# Patient Record
Sex: MALE | Race: WHITE | Employment: FULL TIME | URBAN - METROPOLITAN AREA
[De-identification: names, ages, dates, MRNs, and addresses within clinical notes are randomized per-mention and may not be internally consistent; named-entity substitution may affect disease eponyms.]

---

## 2021-06-16 ENCOUNTER — OFFICE VISIT (OUTPATIENT)
Dept: FAMILY MEDICINE | Facility: CLINIC | Age: 66
End: 2021-06-16
Payer: COMMERCIAL

## 2021-06-16 VITALS
DIASTOLIC BLOOD PRESSURE: 76 MMHG | TEMPERATURE: 98.1 F | OXYGEN SATURATION: 97 % | HEIGHT: 71 IN | SYSTOLIC BLOOD PRESSURE: 122 MMHG | WEIGHT: 243.5 LBS | HEART RATE: 74 BPM | BODY MASS INDEX: 34.09 KG/M2 | RESPIRATION RATE: 16 BRPM

## 2021-06-16 DIAGNOSIS — E85.4 AMYLOIDOSIS OF SKIN (H): ICD-10-CM

## 2021-06-16 DIAGNOSIS — R22.1 LOCALIZED SWELLING, MASS OR LUMP OF NECK: Primary | ICD-10-CM

## 2021-06-16 DIAGNOSIS — L99 AMYLOIDOSIS OF SKIN (H): ICD-10-CM

## 2021-06-16 DIAGNOSIS — Z13.220 SCREENING FOR HYPERLIPIDEMIA: ICD-10-CM

## 2021-06-16 DIAGNOSIS — Z12.11 SCREEN FOR COLON CANCER: ICD-10-CM

## 2021-06-16 DIAGNOSIS — Z13.1 SCREENING FOR DIABETES MELLITUS: ICD-10-CM

## 2021-06-16 LAB
ERYTHROCYTE [DISTWIDTH] IN BLOOD BY AUTOMATED COUNT: 12.9 % (ref 10–15)
ERYTHROCYTE [SEDIMENTATION RATE] IN BLOOD BY WESTERGREN METHOD: 7 MM/H (ref 0–20)
HCT VFR BLD AUTO: 40.3 % (ref 40–53)
HGB BLD-MCNC: 13.4 G/DL (ref 13.3–17.7)
MCH RBC QN AUTO: 30.5 PG (ref 26.5–33)
MCHC RBC AUTO-ENTMCNC: 33.3 G/DL (ref 31.5–36.5)
MCV RBC AUTO: 92 FL (ref 78–100)
PLATELET # BLD AUTO: 240 10E9/L (ref 150–450)
RBC # BLD AUTO: 4.39 10E12/L (ref 4.4–5.9)
WBC # BLD AUTO: 6.8 10E9/L (ref 4–11)

## 2021-06-16 PROCEDURE — 85027 COMPLETE CBC AUTOMATED: CPT | Performed by: FAMILY MEDICINE

## 2021-06-16 PROCEDURE — 80048 BASIC METABOLIC PNL TOTAL CA: CPT | Performed by: FAMILY MEDICINE

## 2021-06-16 PROCEDURE — 99204 OFFICE O/P NEW MOD 45 MIN: CPT | Performed by: FAMILY MEDICINE

## 2021-06-16 PROCEDURE — 86140 C-REACTIVE PROTEIN: CPT | Performed by: FAMILY MEDICINE

## 2021-06-16 PROCEDURE — 36415 COLL VENOUS BLD VENIPUNCTURE: CPT | Performed by: FAMILY MEDICINE

## 2021-06-16 PROCEDURE — 85652 RBC SED RATE AUTOMATED: CPT | Performed by: FAMILY MEDICINE

## 2021-06-16 PROCEDURE — 80061 LIPID PANEL: CPT | Performed by: FAMILY MEDICINE

## 2021-06-16 ASSESSMENT — PAIN SCALES - GENERAL: PAINLEVEL: MILD PAIN (2)

## 2021-06-16 ASSESSMENT — MIFFLIN-ST. JEOR: SCORE: 1899.51

## 2021-06-16 NOTE — PROGRESS NOTES
Assessment & Plan   1. Localized swelling, mass or lump of neck: Given how brief these swellings occur most likely submandibular gland obstruction.  Will check for evidence of infection or other causes CRP, ESR, and CBC.  Ultrasound ordered for further evaluation.  Not significantly palpable today.  Patient agreeable plan.  Reassured.  Given short duration lymphadenopathy, thyroid gland tumor, and other pathology less likely.  - CBC with platelets  - CRP, inflammation  - ESR: Erythrocyte sedimentation rate  - US Head Neck Soft Tissue; Future    2. Screening for hyperlipidemia  - Lipid panel reflex to direct LDL Fasting    3. Screen for colon cancer  - GASTROENTEROLOGY ADULT REF PROCEDURE ONLY; Future    4. Screening for diabetes mellitus  - Basic metabolic panel  (Ca, Cl, CO2, Creat, Gluc, K, Na, BUN)    5. Amyloidosis of skin (H): Follows at [facility].  Reviewed outside records.    Return in about 2 weeks (around 6/30/2021).    Winston Unger MD  [facility]     This chart is completed utilizing dictation software; typos and/or incorrect word substitutions may unintentionally occur.      Hafsa Lucero is a 65 year old who presents for the following health issues:     HPI     Concern - Neck Concerns  Onset: a few months  Description: patients states that the tissues under his jaw on his left side will swell up really big and they are also tender to the touch. States that this comes and goes and cant figure out a pattern.     Patient states that for the last couple months he has noticed left-sided swelling underneath his jaw.  This will get really large and sometimes tender to the touch.  Will typically resolve in 6 to 7 hours.  Will sometimes notice some dry mouth.  Denies any specific time of day that it may be worse other than sometimes in the morning.  Has not tried anything for this either.  Typically occurs couple times a week.  Denies any ear pain ear pressure,  "ear drainage, sore throat, or other lymph node swollen.    Review of Systems   Constitutional, HEENT, lymph, Derm, MSK, cardiovascular, pulmonary, gi and gu systems are negative, except as otherwise noted.      Objective    /76   Pulse 74   Temp 98.1  F (36.7  C)   Resp 16   Ht 1.792 m (5' 10.55\")   Wt 110.5 kg (243 lb 8 oz)   SpO2 97%   BMI 34.40 kg/m    Body mass index is 34.4 kg/m .  Physical Exam   General: Appears well and in no acute distress.  HEENT: Eyes grossly normal to inspection. Extraocular movements intact. Pupils equal, round, and reactive to light. Mucous membranes moist. No ulcers or lesions noted in the oropharynx.  TMs and ear canals normal.    Heme/Lymph: No supraclavicular, anterior, or posterior cervical lymphadenopathy.   Cardiovascular: Regular rate and rhythm, normal S1 and S2 without murmur. No extra heartsounds or friction rub. Radial pulses present and equal bilaterally.  Respiratory: Lungs clear to auscultation bilaterally. No wheezing or crackles. No prolonged expiration. Symmetrical chest rise.  Musculoskeletal: No gross extremity deformities. No peripheral edema. Normal muscle bulk.     Labs: Pending    Ultrasound pending        "

## 2021-06-16 NOTE — LETTER
June 17, 2021      Nic Saul  [address] [facility] [address]        Dear ,    We are writing to inform you of your test results.    Your screening test for diabetes was normal. Your cholesterol lab results came back normal. Your other inflammatory marker results came back normal. I will call with the ultrasound results when they are available.    Resulted Orders   Lipid panel reflex to direct LDL Fasting   Result Value Ref Range    Cholesterol 161 <200 mg/dL    Triglycerides 90 <150 mg/dL    HDL Cholesterol 50 >39 mg/dL    LDL Cholesterol Calculated 93 <100 mg/dL      Comment:      Desirable:       <100 mg/dl    Non HDL Cholesterol 111 <130 mg/dL   CBC with platelets   Result Value Ref Range    WBC 6.8 4.0 - 11.0 10e9/L    RBC Count 4.39 (L) 4.4 - 5.9 10e12/L    Hemoglobin 13.4 13.3 - 17.7 g/dL    Hematocrit 40.3 40.0 - 53.0 %    MCV 92 78 - 100 fl    MCH 30.5 26.5 - 33.0 pg    MCHC 33.3 31.5 - 36.5 g/dL    RDW 12.9 10.0 - 15.0 %    Platelet Count 240 150 - 450 10e9/L   CRP, inflammation   Result Value Ref Range    CRP Inflammation 4.0 0.0 - 8.0 mg/L   ESR: Erythrocyte sedimentation rate   Result Value Ref Range    Sed Rate 7 0 - 20 mm/h   Basic metabolic panel  (Ca, Cl, CO2, Creat, Gluc, K, Na, BUN)   Result Value Ref Range    Sodium 143 133 - 144 mmol/L    Potassium 4.1 3.4 - 5.3 mmol/L    Chloride 113 (H) 94 - 109 mmol/L    Carbon Dioxide 27 20 - 32 mmol/L    Anion Gap 3 3 - 14 mmol/L    Glucose 93 70 - 99 mg/dL    Urea Nitrogen 15 7 - 30 mg/dL    Creatinine 1.13 0.66 - 1.25 mg/dL    GFR Estimate 67 >60 mL/min/[1.73_m2]      Comment:      Non  GFR Calc  Starting 12/18/2018, serum creatinine based estimated GFR (eGFR) will be   calculated using the Chronic Kidney Disease Epidemiology Collaboration   (CKD-EPI) equation.      GFR Estimate If Black 78 >60 mL/min/[1.73_m2]      Comment:       GFR Calc  Starting 12/18/2018, serum creatinine based estimated GFR  (eGFR) will be   calculated using the Chronic Kidney Disease Epidemiology Collaboration   (CKD-EPI) equation.      Calcium 8.2 (L) 8.5 - 10.1 mg/dL       If you have any questions or concerns, please call the clinic at the number listed above.       Sincerely,      Winston Unger MD

## 2021-06-17 ENCOUNTER — TELEPHONE (OUTPATIENT)
Dept: FAMILY MEDICINE | Facility: CLINIC | Age: 66
End: 2021-06-17

## 2021-06-17 LAB
ANION GAP SERPL CALCULATED.3IONS-SCNC: 3 MMOL/L (ref 3–14)
BUN SERPL-MCNC: 15 MG/DL (ref 7–30)
CALCIUM SERPL-MCNC: 8.2 MG/DL (ref 8.5–10.1)
CHLORIDE SERPL-SCNC: 113 MMOL/L (ref 94–109)
CHOLEST SERPL-MCNC: 161 MG/DL
CO2 SERPL-SCNC: 27 MMOL/L (ref 20–32)
CREAT SERPL-MCNC: 1.13 MG/DL (ref 0.66–1.25)
CRP SERPL-MCNC: 4 MG/L (ref 0–8)
GFR SERPL CREATININE-BSD FRML MDRD: 67 ML/MIN/{1.73_M2}
GLUCOSE SERPL-MCNC: 93 MG/DL (ref 70–99)
HDLC SERPL-MCNC: 50 MG/DL
LDLC SERPL CALC-MCNC: 93 MG/DL
NONHDLC SERPL-MCNC: 111 MG/DL
POTASSIUM SERPL-SCNC: 4.1 MMOL/L (ref 3.4–5.3)
SODIUM SERPL-SCNC: 143 MMOL/L (ref 133–144)
TRIGL SERPL-MCNC: 90 MG/DL

## 2021-06-17 NOTE — TELEPHONE ENCOUNTER
----- Message from Winston Unger MD sent at 6/17/2021  8:42 AM CDT -----  Please inform of results if patient has not viewed in [employer].    Your cell counts and 1 inflammatory maker came back normal. Your other tests are pending.    Please call the clinic with any questions you may have.     Have a great day,    Dr. Klein

## 2021-06-17 NOTE — RESULT ENCOUNTER NOTE
Please inform of results if patient has not viewed in [employer].    Your cell counts and 1 inflammatory maker came back normal. Your other tests are pending.    Please call the clinic with any questions you may have.     Have a great day,    Dr. Klein related to increased  demand for nutrient

## 2021-06-17 NOTE — RESULT ENCOUNTER NOTE
Please inform of results if patient has not viewed in [employer].    Your screening test for diabetes was normal. Your cholesterol lab results came back normal. Your other inflammatory marker results came back normal. I will call with the ultrasound results when they are available.     Please call the clinic with any questions you may have.     Have a great day,    Dr. Klein

## 2021-06-17 NOTE — TELEPHONE ENCOUNTER
Left message for pt to return call, when call is returned give information below and also check to see if other results are back and noted on.      Natividad Bhatt CMA ([facility])

## 2021-06-23 ENCOUNTER — ANCILLARY PROCEDURE (OUTPATIENT)
Dept: ULTRASOUND IMAGING | Facility: CLINIC | Age: 66
End: 2021-06-23
Attending: FAMILY MEDICINE
Payer: COMMERCIAL

## 2021-06-23 DIAGNOSIS — R22.1 LOCALIZED SWELLING, MASS OR LUMP OF NECK: ICD-10-CM

## 2021-06-23 PROCEDURE — 76536 US EXAM OF HEAD AND NECK: CPT | Performed by: RADIOLOGY

## 2021-06-24 ENCOUNTER — TELEPHONE (OUTPATIENT)
Dept: FAMILY MEDICINE | Facility: CLINIC | Age: 66
End: 2021-06-24

## 2021-06-24 DIAGNOSIS — K11.20 SIALADENITIS: Primary | ICD-10-CM

## 2021-06-24 RX ORDER — CEPHALEXIN 500 MG/1
500 CAPSULE ORAL 4 TIMES DAILY
Qty: 28 CAPSULE | Refills: 0 | Status: SHIPPED | OUTPATIENT
Start: 2021-06-24 | End: 2021-07-01

## 2021-06-24 NOTE — TELEPHONE ENCOUNTER
Pt was returning call from provider. Provider please review and advise and we can not see what you called him for. If calling him back call [phone].    Natividad Bhatt CMA ([facility])

## 2021-06-24 NOTE — TELEPHONE ENCOUNTER
I called patient. Please call and schedule ent appointment. Prefers any time after 2 pm. Prefers [place].    Winston Unger MD  [facility]

## 2021-07-02 ENCOUNTER — OFFICE VISIT (OUTPATIENT)
Dept: OTOLARYNGOLOGY | Facility: CLINIC | Age: 66
End: 2021-07-02
Attending: FAMILY MEDICINE
Payer: COMMERCIAL

## 2021-07-02 VITALS — SYSTOLIC BLOOD PRESSURE: 130 MMHG | DIASTOLIC BLOOD PRESSURE: 71 MMHG | HEART RATE: 67 BPM | OXYGEN SATURATION: 96 %

## 2021-07-02 DIAGNOSIS — K11.20 SIALOADENITIS: Primary | ICD-10-CM

## 2021-07-02 PROCEDURE — 99204 OFFICE O/P NEW MOD 45 MIN: CPT | Mod: 25 | Performed by: OTOLARYNGOLOGY

## 2021-07-02 PROCEDURE — 42650 DILATION OF SALIVARY DUCT: CPT | Performed by: OTOLARYNGOLOGY

## 2021-07-02 RX ORDER — CIPROFLOXACIN 500 MG/1
500 TABLET, FILM COATED ORAL 2 TIMES DAILY
Qty: 20 TABLET | Refills: 0 | Status: SHIPPED | OUTPATIENT
Start: 2021-07-02 | End: 2021-08-13

## 2021-07-02 ASSESSMENT — ENCOUNTER SYMPTOMS
VOMITING: 0
HEARTBURN: 0
BRUISES/BLEEDS EASILY: 0
SORE THROAT: 1
TINGLING: 0
STRIDOR: 0
DOUBLE VISION: 0
NAUSEA: 0
PHOTOPHOBIA: 0
BLURRED VISION: 0
CONSTITUTIONAL NEGATIVE: 1
COUGH: 0
HEMOPTYSIS: 0
SINUS PAIN: 0
SPUTUM PRODUCTION: 0
HEADACHES: 0
DIZZINESS: 0

## 2021-07-02 NOTE — LETTER
2021         RE: Nic Saul  [address]  [facility] [address]        Dear Colleague,    Thank you for referring your patient, Nic Saul, to the [facility]. Please see a copy of my visit note below.    HPI    This is a 65 year old patient who has been having recurrent salivary gland infections in his left submandibular gland for the past several months. Treated with antibiotics when it flared up. Denies any dehydration, recent surgery, trauma, viral infections, salivary gland stones, hypothyroidism, or diabetes.    Problem List     Hyperlipidemia LDL goal <160  Amyloidosis of skin (H)  Advanced directives, counseling/discussion  PINZON (dyspnea on exertion)     Medications     Prior Authorizations  tacrolimus (PROTOPIC) 0.1 % ointment  cephALEXin (KEFLEX) 500 MG capsule()  fluorouracil (EFUDEX) 5 % cream  Hydrocortisone 1 % CREA rectal cream     IMPRESSION: US Neck  Mildly prominent left submandibular gland at the site of palpable  concern with a prominent lymph node adjacent to or along the periphery  of the submandibular gland, as well as a prominent duct running  through the submandibular gland. No definite stones are identified.  Findings suggestive of mild sialadenitis.     Review of Systems   Constitutional: Negative.    HENT: Positive for sore throat. Negative for congestion, ear discharge, ear pain, hearing loss, nosebleeds, sinus pain and tinnitus.    Eyes: Negative for blurred vision, double vision and photophobia.   Respiratory: Negative for cough, hemoptysis, sputum production and stridor.    Gastrointestinal: Negative for heartburn, nausea and vomiting.   Skin: Negative.    Neurological: Negative for dizziness, tingling and headaches.   Endo/Heme/Allergies: Negative for environmental allergies. Does not bruise/bleed easily.       Physical Exam  Vitals signs reviewed.   Constitutional:       Appearance: Normal appearance.   HENT:      Head:  Normocephalic and atraumatic.      Right Ear: Hearing, tympanic membrane, ear canal and external ear normal. There is no impacted cerumen.      Left Ear: Hearing, tympanic membrane, ear canal and external ear normal. There is impacted cerumen.      Nose: No septal deviation, mucosal edema, congestion or rhinorrhea.      Right Turbinates: Not enlarged or swollen.      Left Turbinates: Not enlarged or swollen.      Mouth/Throat:      Mouth: Mucous membranes are moist.      Pharynx: Oropharynx is clear. Uvula midline.   Eyes:      Extraocular Movements: Extraocular movements intact.      Pupils: Pupils are equal, round, and reactive to light.   Neurological:      Mental Status: He is alert.     Left submandibular gland was dilated with several dilators and purulent secretion was observed from the left [place] duct.    A/P    This pleasant patient is having left submandibular sialadenitis. I will give him a systemic antibiotic, Ciprofloxacin 500 mg. BID for 10 days + good hydration+ Submandibular massages. F/u in a month. His questions were answered.        Again, thank you for allowing me to participate in the care of your patient.        Sincerely,        Monisha Fitzgerald MD

## 2021-07-02 NOTE — NURSING NOTE
Nic Saul's goals for this visit include:   Chief Complaint   Patient presents with     Consult     Sialadenitis, onset Mar 2021, On Cephelexin.        He requests these members of his care team be copied on today's visit information: yes    PCP: No Ref-Primary, Physician    Referring Provider:  Win[facility] Unger [place]  [address] [facility]  [place] [address]    /71   Pulse 67   SpO2 96%     Do you need any medication refills at today's visit? no

## 2021-07-02 NOTE — PROGRESS NOTES
HPI    This is a 65 year old patient who has been having recurrent salivary gland infections in his left submandibular gland for the past several months. Treated with antibiotics when it flared up. Denies any dehydration, recent surgery, trauma, viral infections, salivary gland stones, hypothyroidism, or diabetes.    Problem List     Hyperlipidemia LDL goal <160  Amyloidosis of skin (H)  Advanced directives, counseling/discussion  PINZON (dyspnea on exertion)     Medications     Prior Authorizations  tacrolimus (PROTOPIC) 0.1 % ointment  cephALEXin (KEFLEX) 500 MG capsule()  fluorouracil (EFUDEX) 5 % cream  Hydrocortisone 1 % CREA rectal cream     IMPRESSION: US Neck  Mildly prominent left submandibular gland at the site of palpable  concern with a prominent lymph node adjacent to or along the periphery  of the submandibular gland, as well as a prominent duct running  through the submandibular gland. No definite stones are identified.  Findings suggestive of mild sialadenitis.     Review of Systems   Constitutional: Negative.    HENT: Positive for sore throat. Negative for congestion, ear discharge, ear pain, hearing loss, nosebleeds, sinus pain and tinnitus.    Eyes: Negative for blurred vision, double vision and photophobia.   Respiratory: Negative for cough, hemoptysis, sputum production and stridor.    Gastrointestinal: Negative for heartburn, nausea and vomiting.   Skin: Negative.    Neurological: Negative for dizziness, tingling and headaches.   Endo/Heme/Allergies: Negative for environmental allergies. Does not bruise/bleed easily.       Physical Exam  Vitals signs reviewed.   Constitutional:       Appearance: Normal appearance.   HENT:      Head: Normocephalic and atraumatic.      Right Ear: Hearing, tympanic membrane, ear canal and external ear normal. There is no impacted cerumen.      Left Ear: Hearing, tympanic membrane, ear canal and external ear normal. There is impacted cerumen.      Nose: No  septal deviation, mucosal edema, congestion or rhinorrhea.      Right Turbinates: Not enlarged or swollen.      Left Turbinates: Not enlarged or swollen.      Mouth/Throat:      Mouth: Mucous membranes are moist.      Pharynx: Oropharynx is clear. Uvula midline.   Eyes:      Extraocular Movements: Extraocular movements intact.      Pupils: Pupils are equal, round, and reactive to light.   Neurological:      Mental Status: He is alert.     Left submandibular gland was dilated with several dilators and purulent secretion was observed from the left [place] duct.    A/P    This pleasant patient is having left submandibular sialadenitis. I will give him a systemic antibiotic, Ciprofloxacin 500 mg. BID for 10 days + good hydration+ Submandibular massages. F/u in a month. His questions were answered.

## 2021-07-12 ENCOUNTER — TELEPHONE (OUTPATIENT)
Dept: OTOLARYNGOLOGY | Facility: CLINIC | Age: 66
End: 2021-07-12

## 2021-07-12 NOTE — TELEPHONE ENCOUNTER
Phone call to pt on home and cell numbers. Left messages to call back clinic to discuss symptoms. Theresa Fuller RN

## 2021-07-12 NOTE — TELEPHONE ENCOUNTER
[facility] Call Center    Phone Message    May a detailed message be left on voicemail: yes     Reason for Call: Patient called and schedule an appointment for 7/23 for a follow up on his infection in his neck. Patient wants to know if he can be seen sooner since it is swollen again and he is on antibiotics. Please advise. Thank you.    Action Taken: Message routed to:  Adult Clinics: ENT p [id number]    Travel Screening: Not Applicable

## 2021-07-13 NOTE — TELEPHONE ENCOUNTER
Phone call received from pt stating that submandibular gland swelled over the weekend and he was unable to get it to go down.  After massage, the gland has now decreased in size again.  Discussed continuing massage, warm packs as needed and trying sour candy like lemon drops to stimulate saliva production.  Pt states he will try the above, and call back if symptoms return. Pt is scheduled for clinic appointment on 7/23/21. Theresa Fuller RN

## 2021-08-13 ENCOUNTER — OFFICE VISIT (OUTPATIENT)
Dept: OTOLARYNGOLOGY | Facility: CLINIC | Age: 66
End: 2021-08-13
Payer: COMMERCIAL

## 2021-08-13 DIAGNOSIS — K11.5 SIALOLITHIASIS OF SUBMANDIBULAR GLAND: Primary | ICD-10-CM

## 2021-08-13 PROCEDURE — 99213 OFFICE O/P EST LOW 20 MIN: CPT | Performed by: OTOLARYNGOLOGY

## 2021-08-13 ASSESSMENT — PAIN SCALES - GENERAL: PAINLEVEL: NO PAIN (0)

## 2021-08-13 NOTE — NURSING NOTE
Nic Saul's goals for this visit include:   Chief Complaint   Patient presents with     RECHECK     recheck left parotid gland swelling       He requests these members of his care team be copied on today's visit information:     PCP: No Ref-Primary, Physician    Referring Provider:  No referring provider defined for this encounter.    There were no vitals taken for this visit.    Do you need any medication refills at today's visit? No    Theresa Fuller RN

## 2021-08-13 NOTE — PROGRESS NOTES
HPI    This 66 year old patient is here for the f/u. He has been having recurrent salivary gland infections in his left submandibular gland for the past several months. Treated with antibiotics when it flared up. Denies any dehydration, recent surgery, trauma, viral infections, salivary gland stones, hypothyroidism, or diabetes.    Problem List     Hyperlipidemia LDL goal <160  Amyloidosis of skin (H)  Advanced directives, counseling/discussion  PINZON (dyspnea on exertion)     Medications     Prior Authorizations  tacrolimus (PROTOPIC) 0.1 % ointment  cephALEXin (KEFLEX) 500 MG capsule()  fluorouracil (EFUDEX) 5 % cream  Hydrocortisone 1 % CREA rectal cream     IMPRESSION: US Neck  Mildly prominent left submandibular gland at the site of palpable  concern with a prominent lymph node adjacent to or along the periphery  of the submandibular gland, as well as a prominent duct running  through the submandibular gland. No definite stones are identified.  Findings suggestive of mild sialadenitis.     Review of Systems   Constitutional: Negative.    HENT: Positive for sore throat. Negative for congestion, ear discharge, ear pain, hearing loss, nosebleeds, sinus pain and tinnitus.    Eyes: Negative for blurred vision, double vision and photophobia.   Respiratory: Negative for cough, hemoptysis, sputum production and stridor.    Gastrointestinal: Negative for heartburn, nausea and vomiting.   Skin: Negative.    Neurological: Negative for dizziness, tingling and headaches.   Endo/Heme/Allergies: Negative for environmental allergies. Does not bruise/bleed easily.       Physical Exam  Vitals signs reviewed.   Constitutional:       Appearance: Normal appearance.   HENT:      Head: Normocephalic and atraumatic.      Right Ear: Hearing, tympanic membrane, ear canal and external ear normal. There is no impacted cerumen.      Left Ear: Hearing, tympanic membrane, ear canal and external ear normal. There is impacted cerumen.       Nose: No septal deviation, mucosal edema, congestion or rhinorrhea.      Right Turbinates: Not enlarged or swollen.      Left Turbinates: Not enlarged or swollen.      Mouth/Throat:      Mouth: Mucous membranes are moist.      Pharynx: Oropharynx is clear. Uvula midline.   Eyes:      Extraocular Movements: Extraocular movements intact.      Pupils: Pupils are equal, round, and reactive to light.   Neurological:      Mental Status: He is alert.     Left submandibular gland was dilated with several dilators and no purulent secretion was observed from the left [place] duct.    A/P    This pleasant patient is having left submandibular pathology, likely sialolithiasis. I will continue with good hydration+ Submandibular massages. F/u as needed. His questions were answered.